# Patient Record
(demographics unavailable — no encounter records)

---

## 2017-05-14 NOTE — ER DOCUMENT REPORT
HPI





- HPI


Patient complains to provider of: cough


Onset: Yesterday


Onset/Duration: Worse


Quality of pain: Other - Tightness


Pain Level: 5


Context: 


Patient complains of cough and congestion that started yesterday.  Patient does 

report recent sick contact was diagnosed with bronchitis recently.  Patient 

without any fever.  Patient has had wheezing and complains of chest tightness.


Associated Symptoms: Chest pain, Nonproductive cough, Sinus pain/drainage.  

denies: Chills, Fever, Nausea, Vomiting, Sore throat


Exacerbated by: Denies


Relieved by: Denies


Similar symptoms previously: No


Recently seen / treated by doctor: No





- ROS


ROS below otherwise negative: Yes


Systems Reviewed and Negative: Yes All other systems reviewed and negative





- CONSTITUTIONAL


Constitutional: DENIES: Fever, Chills





- EENT


EENT: REPORTS: Nasal Drainage-Clear.  DENIES: Sore Throat





- NEURO


Neurology: DENIES: Headache





- CARDIOVASCULAR


Cardiovascular: REPORTS: Chest pain





- RESPIRATORY


Respiratory: REPORTS: Coughing.  DENIES: Trouble Breathing





- GASTROINTESTINAL


Gastrointestinal: DENIES: Abdominal Pain, Patient vomiting, Diarrhea





- REPRODUCTIVE


Reproductive: DENIES: Pregnant:





- MUSCULOSKELETAL


Musculoskeletal: DENIES: Back Pain





- DERM


Skin Color: Normal


Skin Problems: None





Past Medical History





- General


Information source: Patient, Parent





- Social History


Smoking Status: Never Smoker


Frequency of alcohol use: None


Drug Abuse: None


Lives with: Family


Family History: Reviewed & Not Pertinent





- Medical History


Medical History: Negative


Renal/ Medical History: Denies: Hx Peritoneal Dialysis


Surgical Hx: Negative





- Immunizations


Immunizations up to date: Yes


Hx Diphtheria, Pertussis, Tetanus Vaccination: Yes





Vertical Provider Document





- CONSTITUTIONAL


Agree With Documented VS: Yes


Exam Limitations: No Limitations


General Appearance: WD/WN, No Apparent Distress





- INFECTION CONTROL


TRAVEL OUTSIDE OF THE [place] IN LAST 30 DAYS: No





- HEENT


HEENT: Atraumatic, Normocephalic.  negative: Pharyngeal Exudate, Pharyngeal 

Tenderness, Pharyngeal Erythema, Tympanic Membrane Red, Tympanic Membrane 

Bulging


Notes: 


Clear rhinorrhea, swollen nasal mucosa





- NECK


Neck: Normal Inspection, Supple.  negative: Lymphadenopathy-Left, 

Lymphadenopathy-Right





- RESPIRATORY


Respiratory: No Respiratory Distress, Wheezing - Diffuse wheezing bilaterally


O2 Sat by Pulse Oximetry: 99





- CARDIOVASCULAR


Cardiovascular: Regular Rhythm, No Murmur, Tachycardia





- BACK


Back: Normal Inspection





- MUSCULOSKELETAL/EXTREMETIES


Musculoskeletal/Extremeties: MAEW





- NEURO


Level of Consciousness: Awake, Alert, Appropriate


Motor/Sensory: No Motor Deficit





- DERM


Integumentary: Warm, Dry, No Rash





Course





- Re-evaluation


Re-evalutation: 


05/14/17 14:59


Patient with improved air movement bilaterally with continued scattered 

wheezing.  Father does not want to wait for third nebulizer treatment and would 

like to just get her prescriptions and go home.





05/14/17 


Patient tachycardic at discharge, patient with improved respiratory effort, 

resolved chest tightness, increased air movement and decreased wheezing 

bilaterally.  Suspect the patient's tachycardia is a result of the side effects 

of her nebulizer treatment that was just given.  Father declines waiting for 

any additional nebulizer treatments or any additional care at this time.





- Vital Signs


Vital signs: 


 











Temp Pulse Resp BP Pulse Ox


 


 98.6 F   110 H  18   132/71 H  99 


 


 05/14/17 13:19  05/14/17 13:19  05/14/17 13:19  05/14/17 13:19  05/14/17 13:19














Discharge





- Discharge


Clinical Impression: 


 Wheezing





Upper respiratory infection


Qualifiers:


 URI type: unspecified URI Qualified Code(s): J06.9 - Acute upper respiratory 

infection, unspecified





Condition: Stable


Disposition: HOME, SELF-CARE


Additional Instructions: 


Return immediately for any new or worsening symptoms





Followup with your primary care provider, call tomorrow to make a followup 

appointment





UPPER RESPIRATORY ILLNESS:





     You have a viral infection of the respiratory passages -- a "cold."  This 

common infection causes nasal congestion, drainage, and often sore throat and 

cough.  It is highly contagious.  The disease usually lasts about 10 to 14 days.


     There is no "cure" for the viral infection -- it must run its course.  If 

there is a complication, such as bacterial infection in the nose, sinuses, 

middle ear, or bronchial tubes, antibiotics may be required.  The antibiotics 

won't affect the virus.


     Drink plenty of fluids.  A humidifier may help.  An expectorant medication 

or decongestant may make you more comfortable.  Use acetaminophen or ibuprofen 

for fever or aches.


     See the doctor if fever persists over two days, if there is any 

significant worsening of your symptoms, or if you simply fail to improve as 

expected.








BRONCHOSPASM:





     You have tightness in the bronchial tubes, called bronchospasm. This often 

occurs with bronchial infections.  Allergies, inhaled chemicals, and polluted 

or cold air can also provoke bronchospasm. It's more likely in patients with 

asthma in the family.


     Emergency treatment of bronchospasm may include adrenaline shots or 

bronchodilator aerosol.  You may feel lightheaded and have a rapid pulse for an 

hour or two.  Rest and get plenty of fluids.


     At home, we'll treat you with a bronchodilator inhaler. Antibiotics and 

corticosteroids may be required for some patients. Until you recover, avoid 

chemical fumes, dusts, pollens, and exercising in very cold or dry air. If you 

smoke, stop now!!


     If you develop a fever, increased wheezing, chest pain, or severe 

shortness of breath, you should contact the doctor immediately.





INHALED BRONCHODILATORS:


     You have received a treatment of and/or prescription for an inhaled 

bronchodilator -- a medication which stimulates the airways in the lung to 

dilate.  This improves the flow of air in asthma, bronchitis, and emphysema.


     These medicines have some similarity to adrenaline, and can cause similar 

side effects:  shakiness, racing heart, and a sense of nervousness.  These side 

effects decrease with time.  Contact your doctor if these side effects are 

severe.


     Do not over-use the medicine.  Too-frequent use of the inhaler may make it 

ineffective.  Call your doctor if the inhaler is not controlling your symptoms 

at the prescribed doses.








STEROID MEDICATION:


     You have been given an injection of or oral medicine of the cortisone/

steroid class.  This medication is used to control inflammation or allergy.  

Trav t is usually only given for a short period of time, until the acute process 

subsides.


     There are usually no side effects from short-term use of cortisone-like 

medications.  Some persons feel an increased sense of well-being and are not 

sleepy at bedtime.  Long-term use of cortisone medications is best avoided, 

unless required for a severe condition.  If your condition does not remit, or 

relapses after the course of corticosteroid medication, you should consult your 

physician.








USE OF ACETAMINOPHEN (Tylenol):


     Acetaminophen may be taken for pain relief or fever control. It's much 

safer than aspirin, offering a wider range of "safe" dosages.  It is safe 

during pregnancy.  Some brand names are Tylenol, Panadol, Datril, Anacin 3, 

Tempra, and Liquiprin. Acetaminophen can be repeated every four hours.  The 

following are maximum recommended dosages:


>89 pounds or adults          650 mg to 900 mg


Acetaminophen can be repeated every four hours.  Maximum dose not to exceed 

4000 mg a day.








FOLLOW-UP CARE:


If you have been referred to a physician for follow-up care, call the physician

s office for an appointment as you were instructed or within the next two days.

  If you experience worsening or a significant change in your symptoms, notify 

the physician immediately or return to the Emergency Department at any time for 

re-evaluation.





Prescriptions: 


Albuterol Sulfate [Ventolin Hfa] 2 puff IH Q4HP PRN #17 gm


 PRN Reason: 


Inhaler,Assist Device,Accesory [Optichamber] 1 each MC Q4 PRN #1 each


 PRN Reason: 


Prednisone [Deltasone 20 mg Tablet] 3 tab PO DAILY 4 Days


Referrals: 


RACHEL NGUYEN MD [Primary Care Provider] - Follow up tomorrow

## 2017-12-06 NOTE — ER DOCUMENT REPORT
ED Medical Screen (RME)





- General


Chief Complaint: Nausea/Vomiting


Stated Complaint: VOMITING


Time Seen by Provider: 12/06/17 14:58


Notes: 





16-year-old female patient 6 weeks pregnant with nausea vomiting for the past 

week almost days.  She has been taking Zofran but has not really helped much.  

She has noted some red streaks in the vomitus at times.  She last vomited about 

30 minutes ago.  She is not particularly nauseous at this time.





I have greeted and performed a rapid initial assessment of this patient.  A 

comprehensive ED assessment and evaluation of the patient, analysis of test 

results and completion of the medical decision making process will be conducted 

by additional ED providers.


TRAVEL OUTSIDE OF THE [place] IN LAST 30 DAYS: No





Past Medical History


Renal/ Medical History: Denies: Hx Peritoneal Dialysis





- Immunizations


Immunizations up to date: Yes


Hx Diphtheria, Pertussis, Tetanus Vaccination: Yes





Physical Exam





- Vital signs


Vitals: 





 











Temp Pulse Resp BP Pulse Ox


 


 98.2 F   137 H  18   116/77   100 


 


 12/06/17 14:53  12/06/17 14:53  12/06/17 14:53  12/06/17 14:53  12/06/17 14:53














Course





- Vital Signs


Vital signs: 





 











Temp Pulse Resp BP Pulse Ox


 


 98.2 F   137 H  18   116/77   100 


 


 12/06/17 14:53  12/06/17 14:53  12/06/17 14:53  12/06/17 14:53  12/06/17 14:53

## 2017-12-06 NOTE — ER DOCUMENT REPORT
HPI





- HPI


Patient complains to provider of: Nausea and vomiting


Onset: Last week


Onset/Duration: Persistent


Quality of pain: No pain


Pain Level: Denies


Context: 





Patient presents stating she is 6 weeks pregnant and has had nausea and 

vomiting for the past week.  Patient reports vomiting 12 times today.  Patient 

denies any abdominal tenderness, vaginal bleeding or discharge.  Patient denies 

any urinary symptoms.  Patient has not followed up with a gynecologist to 

establish prenatal care.  Mother states patient had left over prescription of 

Zofran at home and she has been taking this but it is not been helping her 

symptoms.


Associated Symptoms: Nausea, Vomiting.  denies: Fever


Exacerbated by: Denies


Relieved by: Denies


Similar symptoms previously: No


Recently seen / treated by doctor: No





- ROS


ROS below otherwise negative: Yes


Systems Reviewed and Negative: Yes All other systems reviewed and negative





- CONSTITUTIONAL


Constitutional: DENIES: Fever, Chills





- EENT


EENT: DENIES: Sore Throat, Congestion





- NEURO


Neurology: DENIES: Headache





- CARDIOVASCULAR


Cardiovascular: DENIES: Chest pain





- RESPIRATORY


Respiratory: DENIES: Coughing





- GASTROINTESTINAL


Gastrointestinal: REPORTS: Nausea, Patient vomiting.  DENIES: Abdominal Pain, 

Diarrhea





- URINARY


Urinary: DENIES: Dysuria, Urgency, Frequency





- REPRODUCTIVE


Reproductive: DENIES: Pregnant:





- MUSCULOSKELETAL


Musculoskeletal: DENIES: Back Pain





- DERM


Skin Color: Normal, Pink


Skin Problems: None





Past Medical History





- General


Information source: Patient, Parent


Last Menstrual Period: 6 weeks pregnant





- Social History


Smoking Status: Never Smoker


Chew tobacco use (# tins/day): No


Frequency of alcohol use: None


Drug Abuse: None


Lives with: Family


Family History: Reviewed & Not Pertinent


Patient has suicidal ideation: No


Patient has homicidal ideation: No





- Medical History


Medical History: Negative


Renal/ Medical History: Denies: Hx Peritoneal Dialysis


Surgical Hx: Negative





- Immunizations


Immunizations up to date: Yes


Hx Diphtheria, Pertussis, Tetanus Vaccination: Yes





Vertical Provider Document





- CONSTITUTIONAL


Agree With Documented VS: Yes


Exam Limitations: No Limitations


General Appearance: WD/WN, No Apparent Distress





- INFECTION CONTROL


TRAVEL OUTSIDE OF THE [place] IN LAST 30 DAYS: No





- HEENT


HEENT: Atraumatic, Normocephalic





- NECK


Neck: Normal Inspection, Supple





- RESPIRATORY


Respiratory: Breath Sounds Normal, No Respiratory Distress


O2 Sat by Pulse Oximetry: 100





- CARDIOVASCULAR


Cardiovascular: Regular Rhythm, No Murmur, Tachycardia





- GI/ABDOMEN


Gastrointestinal: Abdomen Soft, Abdomen Non-Tender, No Organomegaly





- BACK


Back: Normal Inspection.  negative: CVA Tenderness-Right, CVA Tenderness-Left





- MUSCULOSKELETAL/EXTREMETIES


Musculoskeletal/Extremeties: MAEW





- NEURO


Level of Consciousness: Awake, Alert, Appropriate


Motor/Sensory: No Motor Deficit





- DERM


Integumentary: Warm, Dry, No Rash





Course





- Re-evaluation


Re-evalutation: 





12/06/17 


Patient continues to deny any abdominal discomfort.  Patient is tolerating oral 

fluids.





- Vital Signs


Vital signs: 


 











Temp Pulse Resp BP Pulse Ox


 


 98.2 F   137 H  18   116/77   100 


 


 12/06/17 14:53  12/06/17 14:53  12/06/17 14:53  12/06/17 14:53  12/06/17 14:53














- Laboratory


Result Diagrams: 


 12/06/17 15:25





 12/06/17 15:25


Laboratory results interpreted by me: 


 











  12/06/17 12/06/17 12/06/17





  15:25 15:25 15:25


 


WBC  14.6 H  


 


RBC  5.73 H  


 


Hgb  16.8 H  


 


Hct  47.2 H  


 


Seg Neutrophils %  85.2 H  


 


Lymphocytes %  9.7 L  


 


Absolute Neutrophils  12.4 H  


 


BUN   6 L 


 


Total Protein   6.2 L 


 


Urine Protein    30 H


 


Urine Ketones    80 H











12/06/17 18:13


 Labs- Entire Visit











  12/06/17 12/06/17 12/06/17





  15:25 15:25 15:25


 


WBC  14.6 H  


 


RBC  5.73 H  


 


Hgb  16.8 H  


 


Hct  47.2 H  


 


MCV  82  


 


MCH  29.4  


 


MCHC  35.7  


 


RDW  13.1  


 


Plt Count  239  


 


Seg Neutrophils %  85.2 H  


 


Lymphocytes %  9.7 L  


 


Monocytes %  4.6  


 


Eosinophils %  0.1  


 


Basophils %  0.4  


 


Absolute Neutrophils  12.4 H  


 


Absolute Lymphocytes  1.4  


 


Absolute Monocytes  0.7  


 


Absolute Eosinophils  0.0  


 


Absolute Basophils  0.1  


 


Sodium   139.0 


 


Potassium   4.2 


 


Chloride   102 


 


Carbon Dioxide   23 


 


Anion Gap   14 


 


BUN   6 L 


 


Creatinine   0.59 


 


Est GFR ( Amer)   EGFR NOT CALCULATED 


 


Est GFR (Non-Af Amer)   EGFR NOT CALCULATED 


 


Glucose   85 


 


Calcium   10.0 


 


Total Bilirubin   0.8 


 


Direct Bilirubin   0.3 


 


Neonat Total Bilirubin   Not Reportable 


 


Neonat Direct Bilirubin   Not Reportable 


 


Neonat Indirect Bili   Not Reportable 


 


AST   21 


 


ALT   28 


 


Alkaline Phosphatase   71 


 


Total Protein   6.2 L 


 


Albumin   4.8 


 


Serum HCG, Qual   


 


Urine Color    YELLOW


 


Urine Appearance    SLIGHTLY-CLOUDY


 


Urine pH    5.0


 


Ur Specific Gravity    1.029


 


Urine Protein    30 H


 


Urine Glucose (UA)    NEGATIVE


 


Urine Ketones    80 H


 


Urine Blood    NEGATIVE


 


Urine Nitrite    NEGATIVE


 


Urine Bilirubin    NEGATIVE


 


Urine Urobilinogen    NEGATIVE


 


Ur Leukocyte Esterase    NEGATIVE


 


Urine RBC    0-1


 


Urine WBC    0-1


 


Urine Ascorbic Acid    NEGATIVE














  12/06/17





  15:25


 


WBC 


 


RBC 


 


Hgb 


 


Hct 


 


MCV 


 


MCH 


 


MCHC 


 


RDW 


 


Plt Count 


 


Seg Neutrophils % 


 


Lymphocytes % 


 


Monocytes % 


 


Eosinophils % 


 


Basophils % 


 


Absolute Neutrophils 


 


Absolute Lymphocytes 


 


Absolute Monocytes 


 


Absolute Eosinophils 


 


Absolute Basophils 


 


Sodium 


 


Potassium 


 


Chloride 


 


Carbon Dioxide 


 


Anion Gap 


 


BUN 


 


Creatinine 


 


Est GFR ( Amer) 


 


Est GFR (Non-Af Amer) 


 


Glucose 


 


Calcium 


 


Total Bilirubin 


 


Direct Bilirubin 


 


Neonat Total Bilirubin 


 


Neonat Direct Bilirubin 


 


Neonat Indirect Bili 


 


AST 


 


ALT 


 


Alkaline Phosphatase 


 


Total Protein 


 


Albumin 


 


Serum HCG, Qual  POSITIVE H


 


Urine Color 


 


Urine Appearance 


 


Urine pH 


 


Ur Specific Gravity 


 


Urine Protein 


 


Urine Glucose (UA) 


 


Urine Ketones 


 


Urine Blood 


 


Urine Nitrite 


 


Urine Bilirubin 


 


Urine Urobilinogen 


 


Ur Leukocyte Esterase 


 


Urine RBC 


 


Urine WBC 


 


Urine Ascorbic Acid 














Discharge





- Discharge


Clinical Impression: 


 Dehydration during pregnancy





Nausea & vomiting


Qualifiers:


 Vomiting type: unspecified Vomiting Intractability: non-intractable Qualified 

Code(s): R11.2 - Nausea with vomiting, unspecified





Pregnancy


Qualifiers:


 Weeks of gestation: less than 8 weeks Qualified Code(s): Z3A.01 - Less than 8 

weeks gestation of pregnancy





Condition: Stable


Disposition: HOME, SELF-CARE


Instructions:  Antinausea Medication ([facility]), Intravenous (IV) Fluids ([facility]), 

Vomiting ([facility])


Additional Instructions: 


Return immediately for any new or worsening symptoms





Followup with your primary care provider, call tomorrow to make a followup 

appointment





Follow-up with an OB/GYN provider to establish prenatal care


Prescriptions: 


Promethazine HCl [Phenergan 25 mg Tablet] 12.5 mg PO Q6H PRN #12 tablet


 PRN Reason: 


Forms:  Parent Work Note


Referrals: 


PRAKASH DINH MD [Primary Care Provider] - Follow up as needed


[facility] - Follow up tomorrow

## 2017-12-12 NOTE — ER DOCUMENT REPORT
ED Medical Screen (RME)





- General


Chief Complaint: Vomiting


Stated Complaint: VOMITING 7 WKS PREG


Time Seen by Provider: 12/12/17 15:38


Notes: 





16-year-old female who by history is almost 8 weeks pregnant.  She was seen 

here 6 days ago with hyperemesis gravidarum.  Got IV fluids and was discharged 

with Phenergan.  She reports initially it seemed to help but then quit working.

  She reports vomiting multiple times on a daily basis.





I have greeted and performed a rapid initial assessment of this patient.  A 

comprehensive ED assessment and evaluation of the patient, analysis of test 

results and completion of the medical decision making process will be conducted 

by additional ED providers.


TRAVEL OUTSIDE OF THE [place] IN LAST 30 DAYS: No





- Related Data


Allergies/Adverse Reactions: 


 





No Known Allergies Allergy (Verified 12/12/17 15:16)


 








Home Medications: 


 Current Home Medications





No Home Medications  12/12/17 [History]











Past Medical History





- Social History


Chew tobacco use (# tins/day): No


Frequency of alcohol use: None


Drug Abuse: None


Renal/ Medical History: Denies: Hx Peritoneal Dialysis





- Immunizations


Immunizations up to date: Yes


Hx Diphtheria, Pertussis, Tetanus Vaccination: Yes





Physical Exam





- Vital signs


Vitals: 





 











Temp Pulse Resp BP Pulse Ox


 


 99.1 F   103   17   120/69   99 


 


 12/12/17 15:21  12/12/17 15:21  12/12/17 15:21  12/12/17 15:21  12/12/17 15:21














Course





- Vital Signs


Vital signs: 





 











Temp Pulse Resp BP Pulse Ox


 


 99.1 F   103   17   120/69   99 


 


 12/12/17 15:21  12/12/17 15:21  12/12/17 15:21  12/12/17 15:21  12/12/17 15:21

## 2017-12-12 NOTE — ER DOCUMENT REPORT
ED GI/





- General


Chief Complaint: Vomiting


Stated Complaint: VOMITING 7 WKS PREG


Time Seen by Provider: 12/12/17 15:38


Mode of Arrival: Ambulatory


Information source: Patient, Parent, [facility] Records


Notes: 





This 16-year-old female who is almost 8 weeks pregnant comes emergency room for 

nausea vomiting.  She was seen here 6 days ago with the diagnosis of 

hyperemesis gravidarum.  She received IV fluids and was discharged home with 

[facility].  She reports it initially seemed to help, but then quit working.  

She reports she has been vomiting multiple times on a daily basis.





She has not been seen at the health department yet for prenatal care.  Her 

father reports that they have been "getting the run around".


TRAVEL OUTSIDE OF THE [place] IN LAST 30 DAYS: No





- Related Data


Allergies/Adverse Reactions: 


 





No Known Allergies Allergy (Verified 12/12/17 15:16)


 











Past Medical History





- General


Information source: Patient, Parent, [facility] Records





- Social History


Smoking Status: Never Smoker


Cigarette use (# per day): No


Chew tobacco use (# tins/day): No


Smoking Education Provided: No


Frequency of alcohol use: None


Drug Abuse: None


Occupation: Unemployed


Lives with: Parents


Family History: Reviewed & Not Pertinent


Patient has suicidal ideation: No


Patient has homicidal ideation: No





- Medical History


Medical History: Negative


Surgical Hx: Negative





- Immunizations


Immunizations up to date: Yes


Hx Diphtheria, Pertussis, Tetanus Vaccination: Yes





Review of Systems





- Review of Systems


Constitutional: No symptoms reported


EENT: No symptoms reported


Cardiovascular: No symptoms reported


Respiratory: No symptoms reported


Gastrointestinal: Nausea, Vomiting


Genitourinary: No symptoms reported


Female Genitourinary: Pregnant


Musculoskeletal: No symptoms reported


Skin: No symptoms reported


Hematologic/Lymphatic: No symptoms reported


Neurological/Psychological: No symptoms reported





Physical Exam





- Vital signs


Vitals: 


 











Temp Pulse Resp BP Pulse Ox


 


 99.1 F   103   17   120/69   99 


 


 12/12/17 15:21  12/12/17 15:21  12/12/17 15:21  12/12/17 15:21  12/12/17 15:21











Interpretation: Tachycardic





- General


General appearance: Appears well, Alert


In distress: Mild





- HEENT


Head: Normocephalic, Atraumatic


Eyes: Normal


Pupils: PERRL


Mucous membranes: Dry


Neck: Normal





- Respiratory


Respiratory status: No respiratory distress


Breath sounds: Normal





- Cardiovascular


Rhythm: Regular, Tachycardia


Heart sounds: Normal auscultation


Murmur: No





- Abdominal


Inspection: Normal


Distension: No distension


Bowel sounds: Normal


Tenderness: Nontender





- Back


Back: Normal





- Extremities


General upper extremity: Normal inspection


General lower extremity: Normal inspection





- Neurological


Neuro grossly intact: Yes





- Psychological


Associated symptoms: Normal affect, Normal mood





- Skin


Skin Temperature: Warm


Skin Moisture: Dry


Skin Color: Normal





Course





- Re-evaluation


Re-evalutation: 





12/12/17 19:38


The patient has been up to urinate twice after receiving 3 L of IV fluids.  She 

does feel much better.  She has had no nausea or vomiting since receiving 

Reglan orally when she first got here.  She has been able to hold down p.o. 

fluids.





- Vital Signs


Vital signs: 


 











Temp Pulse Resp BP Pulse Ox


 


 98.1 F   95   18   121/65   100 


 


 12/12/17 18:23  12/12/17 18:23  12/12/17 18:23  12/12/17 18:23  12/12/17 18:23














- Laboratory


Result Diagrams: 


 12/12/17 16:05





 12/12/17 16:05


Laboratory results interpreted by me: 


 











  12/12/17 12/12/17 12/12/17





  16:05 16:05 16:52


 


WBC  11.9 H  


 


RBC  5.69 H  


 


Hgb  16.8 H  


 


Hct  47.2 H  


 


Absolute Neutrophils  9.0 H  


 


Calcium   10.6 H 


 


Total Protein   8.3 H 


 


Urine Protein    30 H


 


Urine Ketones    80 H


 


Urine Urobilinogen    2.0 H














Discharge





- Discharge


Clinical Impression: 


 Hyperemesis gravidarum, Dehydration during pregnancy





Nausea & vomiting


Qualifiers:


 Vomiting type: unspecified Vomiting Intractability: non-intractable Qualified 

Code(s): R11.2 - Nausea with vomiting, unspecified





Condition: Stable


Disposition: HOME, SELF-CARE


Additional Instructions: 


Hyperemesis Gravidarum





     Hyperemesis gravidarum is the medical term for severe vomiting during 

pregnancy.  We don't know exactly why it occurs, but it's a common problem.


     Dehydration can occur.  This reduces blood flow to the placenta, 

decreasing the baby's nourishment.  The baby will also become dehydrated.  

There can be harmful changes in blood sodium, potassium, or acid balance.


     Our goal is to correct, and prevent, dehydration.  For severe cases, we 

give IV fluids.  Antinausea medication will be prescribed. (Don't be concerned 

about "birth defects" -- the risk to you and your baby from the hyperemesis is 

the biggest problem.  The antinausea medication is very safe at this stage of 

pregnancy.)


     Call the doctor if you have vaginal bleeding, abdominal pain, severe 

lightheadedness or weakness, or other alarming symptoms.








////////////////////////////////////////////////////////////////////////////////

////////////////////////////////////////////////////////





Take medication as prescribed for nausea if needed.


Drink small sips of cool clear liquids throughout the day and evening.


Follow-up with the Health Department this week to start your prenatal care.


Follow-up with [facility] if the nausea vomiting continues 

to be a problem.





RETURN TO THE EMERGENCY ROOM IF ANY NEW OR WORSENING SYMPTOMS.








Prescriptions: 


Metoclopramide HCl [Reglan 10 mg Tablet] 10 mg PO ASDIR PRN #30 tablet


 PRN Reason: 


Referrals: 


[facility] [Provider Group] - Follow up as needed


[facility] [Outside] - Follow up in 3-5 days

## 2018-03-16 NOTE — L&D PROGRESS NOTES
=================================================================

PROGRESS NOTES

=================================================================

Datetime Report Generated by CPN: 03/16/2018 15:32

   

   

=================================================================

PROGRESS NOTE

=================================================================

   

Comment:  abdomen soft and nontender, hyperactive bowel sounds. states

   she has frequent nausea and had BM today but never feels empty after

   BM. enocouraged to increase water, start stool softener, Rx for

   diclegis given

   

=================================================================

SIGNATURE

=================================================================

   

SIGNATURE:  [id number]

Assignment:  Tracy Stewart MD

Signature:  Electronically signed by Leni Palomino CNM  on 3/16/2018 at

   15:31  with User ID: [email or web address]

:  Electronically signed by Leni Palomino CNM  on 3/16/2018 at 15:31  with

   User ID: [email or web address]

## 2018-03-16 NOTE — RADIOLOGY REPORT (SQ)
EXAM DESCRIPTION:  U/S OB LIMITED



COMPLETED DATE/TIME:  3/16/2018 2:28 pm



REASON FOR STUDY:  abdominal pain, cramping, cervical length



COMPARISON:  None.



TECHNIQUE:  Limited transabdominal grayscale ultrasound for evaluation of specific requested obstetri
kb parameters.



LIMITATIONS:  None.



FINDINGS:  CERVICAL LENGTH: 3.8 cm.   Closed.

CHRISTOPH: Largest pocket 4.8  cm.

FHR: 155 beats per minute.

PRESENTATION: Breech.

PLACENTA:  Anterior.

OTHER: No other significant findings.



IMPRESSION:  LIMITED OBSTETRICAL ULTRASOUND WITH MEASURED PARAMETERS DELINEATED ABOVE.

Trimester of pregnancy: Second trimester - 13 weeks 1 day to 27 weeks 6 days.



TECHNICAL DOCUMENTATION:  JOB ID:  [id number]

 2011 [employer]- All Rights Reserved



Reading location - IP/workstation name: [email or web address]

## 2018-06-17 NOTE — ER DOCUMENT REPORT
ED ENT





- General


Chief Complaint: Ear Pain


Stated Complaint: CONGESTION


Time Seen by Provider: 06/17/18 18:28


Mode of Arrival: Ambulatory


Information source: Patient


Notes: 





Patient is a 16 year old female approximately 30 weeks pregnant who presents to 

the ER today for 3 days of congestion, bilateral ear pain, worse on the left 

and feeling like she cannot breathe through her nose.  Patient denies any fevers

, chills, productive cough, shortness of breath or wheezing.  Patient denies 

any history of asthma.  Has not tried anything for her symptoms.


TRAVEL OUTSIDE OF THE [place] IN LAST 30 DAYS: No





- Related Data


Allergies/Adverse Reactions: 


 





No Known Allergies Allergy (Verified 05/14/18 00:39)


 











Past Medical History





- General


Information source: Patient





- Social History


Smoking Status: Never Smoker


Frequency of alcohol use: None


Drug Abuse: None


Family History: Reviewed & Not Pertinent


Patient has suicidal ideation: No


Patient has homicidal ideation: No


Renal/ Medical History: Denies: Hx Peritoneal Dialysis





- Immunizations


Immunizations up to date: Yes


Hx Diphtheria, Pertussis, Tetanus Vaccination: Yes





Review of Systems





- Review of Systems


Constitutional: No symptoms reported


EENT: See HPI


Cardiovascular: No symptoms reported


Respiratory: See HPI


Gastrointestinal: No symptoms reported


Genitourinary: No symptoms reported


Female Genitourinary: No symptoms reported


Musculoskeletal: No symptoms reported


Skin: No symptoms reported


Hematologic/Lymphatic: No symptoms reported


Neurological/Psychological: No symptoms reported





Physical Exam





- Vital signs


Vitals: 


 











Temp Pulse Resp BP Pulse Ox


 


 98.7 F   110 H  20   127/74 H  98 


 


 06/17/18 18:34  06/17/18 18:34  06/17/18 18:34  06/17/18 18:34  06/17/18 18:34














- Notes


Notes: 





PHYSICAL EXAMINATION: 


GENERAL: Mildly ill-appearing, but in no acute distress. 


HEAD: Atraumatic, normocephalic. 


EYES: Pupils equal round and reactive to light, extraocular movements intact, 

sclera anicteric, conjunctiva are normal. 


ENT: ear canals without erythema or foreign body, TMs pearly grey with good 

bony landmarks, nares with mucoid discharge, oropharynx clear without exudates. 

Moist mucous membranes.  Airway patent


NECK: Normal range of motion, supple without lymphadenopathy 


LUNGS: CTAB and equal. No wheezes rales or rhonchi. 


HEART: Regular rate and rhythm without murmurs


ABDOMEN: Gravid abdomen, no tenderness. No guarding, no rebound 


BACK: no vertebral tenderness, normal ROM


GI/: no CVA tenderness


EXTREMITIES: Normal range of motion, no pitting edema. No cyanosis. 


NEUROLOGICAL: Cranial nerves grossly intact. Normal sensory/motor exams. 


PSYCH: Normal mood, normal affect. 


SKIN: Warm, Dry, normal turgor, no rashes or lesions noted 

















Course





- Re-evaluation


Re-evalutation: 





06/17/18 19:45


Patient is mildly tachycardic at 104 bpm, however she is 34 weeks pregnant with 

a cold and so I believe that this is due to a combination of those factors.  

Patient is drinking fluids here in the emergency department without any issue.  

She does not meet criteria for antibiotic with only 3 days of cold-like 

symptoms.  I will prescribe her Zyrtec which is safe in pregnancy and advised 

her to use Vicks vapor rub and nasal saline.





- Vital Signs


Vital signs: 


 











Temp Pulse Resp BP Pulse Ox


 


 98.4 F   104   18   119/76   98 


 


 06/17/18 19:40  06/17/18 19:40  06/17/18 19:40  06/17/18 19:40  06/17/18 19:40














Discharge





- Discharge


Clinical Impression: 


Upper respiratory infection


Qualifiers:


 URI type: acute nasopharyngitis (common cold) Qualified Code(s): J00 - Acute 

nasopharyngitis [common cold]





Condition: Stable


Disposition: HOME, SELF-CARE


Instructions:  Upper Respiratory Illness ([facility])


Additional Instructions: 


Return immediately for any new or worsening symptoms.





Follow up with primary care provider, call tomorrow to make followup 

appointment.





Drink plenty of fluids.  Please use Vicks VaporRub to open up her nasal 

passages as well as nasal saline spray.


Prescriptions: 


Cetirizine HCl [Zyrtec 10 mg Tablet] 1 tab PO DAILY #30 tablet


Referrals: 


IRMA NUNEZ MD [Primary Care Provider] - Follow up as needed

## 2018-07-14 NOTE — ER DOCUMENT REPORT
ED General





- General


Chief Complaint: Rash


Stated Complaint: RASH


Time Seen by Provider: 07/14/18 01:28


Mode of Arrival: Ambulatory


Information source: Patient


Notes: 





Patient is a 16-year-old female who is 37 weeks pregnant presenting with chief 

complaint of rash.  Patient reports that she has had a rash on her left leg, 

abdomen and right arm that has been ongoing for 2 weeks.  Patient reports that 

the rash comes and goes and is very itchy.  Patient denies any exposure to any 

new substances and denies any known allergens.  Patient denies any recent sick 

contacts and denies any fever.


TRAVEL OUTSIDE OF THE [place] IN LAST 30 DAYS: No





- Related Data


Allergies/Adverse Reactions: 


 





No Known Allergies Allergy (Verified 05/14/18 00:39)


 











Past Medical History





- General


Information source: Patient





- Social History


Smoking Status: Never Smoker


Chew tobacco use (# tins/day): No


Drug Abuse: None


Family History: Reviewed & Not Pertinent


Patient has suicidal ideation: No


Patient has homicidal ideation: No





- Medical History


Medical History: Negative


Renal/ Medical History: Denies: Hx Peritoneal Dialysis


Surgical Hx: Negative





- Immunizations


Immunizations up to date: Yes


Hx Diphtheria, Pertussis, Tetanus Vaccination: Yes





Review of Systems





- Review of Systems


Constitutional: No symptoms reported


EENT: No symptoms reported


Cardiovascular: No symptoms reported


Respiratory: No symptoms reported


Gastrointestinal: No symptoms reported


Genitourinary: No symptoms reported


Female Genitourinary: No symptoms reported


Musculoskeletal: No symptoms reported


Skin: See HPI


Hematologic/Lymphatic: No symptoms reported


Neurological/Psychological: No symptoms reported





Physical Exam





- Vital signs


Vitals: 


 











Temp Pulse Resp BP Pulse Ox


 


 98.4 F   95   18   130/83 H  100 


 


 07/14/18 00:52  07/14/18 00:52  07/14/18 00:52  07/14/18 00:52  07/14/18 00:52














- Notes


Notes: 





PHYSICAL EXAMINATION:





GENERAL: Well-appearing, well-nourished and in no acute distress.





HEAD: Atraumatic, normocephalic.





EYES: Pupils equal round and reactive to light, extraocular movements intact, 

conjunctiva are normal.





ENT: Nares patent, oropharynx clear without exudates.  Moist mucous membranes.





NECK: Normal range of motion, supple without lymphadenopathy





LUNGS: Breath sounds clear to auscultation bilaterally and equal.  No wheezes 

rales or rhonchi.





HEART: Regular rate and rhythm without murmurs





ABDOMEN: Soft, nontender, nondistended abdomen.  No guarding, no rebound.  No 

masses appreciated.





Female : deferred





Musculoskeletal: Normal range of motion, no pitting or edema.  No cyanosis.





NEUROLOGICAL: Cranial nerves grossly intact.  Normal speech, normal gait.  

Normal sensory, motor exams





PSYCH: Normal mood, normal affect.





SKIN: Warm, Dry, normal turgor, scattered maculopapular rash to bilateral lower 

extremities, right arm as well as a few areas on the abdomen and right flank.





Course





- Re-evaluation


Re-evalutation: 


16-year-old female presents with complaints of rash.  Patient reports a rash 

has been ongoing for approximately 2 weeks.  Patient reports that she is 37 

weeks pregnant.  Patient denies using any medications at home to try to 

alleviate the itching associated with the rash.  The rash appears to be a 

contact dermatitis of some sort.  Patient scratching the rash consistently 

during exam.  Will provide patient with p.o. Benadryl and place her on 1% 

hydrocortisone cream twice daily.  Patient is to follow-up with her OB/GYN next 

week.





- Vital Signs


Vital signs: 


 











Temp Pulse Resp BP Pulse Ox


 


 98.4 F   95   18   130/83 H  100 


 


 07/14/18 00:52  07/14/18 00:52  07/14/18 00:52  07/14/18 00:52  07/14/18 00:52














Discharge





- Discharge


Clinical Impression: 


Contact dermatitis


Qualifiers:


 Contact dermatitis type: unspecified Contact dermatitis trigger: unspecified 

trigger Qualified Code(s): L25.9 - Unspecified contact dermatitis, unspecified 

cause





Condition: Stable


Disposition: HOME, SELF-CARE


Additional Instructions: 


Rash of Pregnancy





     Some women develop an itchy rash in the second half of pregnancy. Small 

hive-like bumps usually start on the stomach and spread over the trunk, upper 

legs, and upper arms. The cause is unknown.


     The rash is harmless. Antihistamines such as diphenhydramine (Benadryl) 

can be used for itching. Hydrocortisone cream can be used on some of the worst 

spots. For severe cases, cortisone may be prescribed as pills.


     Your doctor should recheck the rash. Call the doctor or return if you 

develop blisters, painful skin, fever, vomiting, headache, or abdominal pain.








Prescriptions: 


Hydrocortisone/Oatmeal/Aloe/E [Hydrocortisone 1% Cream] 28.4 gm TP BID #1 tube


Referrals: 


IRMA NUNEZ MD [Primary Care Provider] - Follow up as needed

## 2018-07-20 NOTE — NON STRESS TEST REPORT
=================================================================

Non Stress Test

=================================================================

Datetime Report Generated by CPN: 07/20/2018 08:15

   

   

=================================================================

DEMOGRAPHIC

=================================================================

   

EGA NST:  38.5

   

=================================================================

INDICATION

=================================================================

   

Indication for Study:  Other

Indication for Study (NST) Other:  labor check

   

=================================================================

VITAL SIGNS

=================================================================

   

Temperature - NST:  97.7

Pulse - NST:  86

RESP - NST:  14

NBPSYS NST:  134

NBPDIA NST:  87

   

=================================================================

MONITORING

=================================================================

   

Monitor Explained:  Monitor Explained; Test Explained; Patient

   Verbalized Understanding

Time on Monitor:  07/20/2018 06:20

Time off Monitor:  07/20/2018 06:42

NST Duration:  22

   

=================================================================

NST INTERVENTIONS

=================================================================

   

NST Interventions:  PO Hydration

Physician Notified NST:  Dr. Major

BABY A:  [id number]

   

=================================================================

BABY A

=================================================================

   

Fetal Movement :  Present

Contraction Frequency :  2-3.5

FHR Baseline :  145

Accelerations :  15X15

Decelerations :  None

Variability :  Moderate 6-25bpm

NST Review:  Meets Criteria for Reactive NST

NST Review and Verified By :  Nickie Coley [facility]

NST Results:  Reactive

   

=================================================================

NST REPORT

=================================================================

   

Report Trigger:  Send Report

## 2018-07-21 NOTE — PDOC PROGRESS REPORT
Subjective-OB


Progress Note for:: 07/21/18





Physical Exam (OB)


Vital Signs: 


 











Temp Pulse Resp BP Pulse Ox


 


 98.0 F   141 H  16   114/61   99 


 


 07/21/18 07:31  07/21/18 07:31  07/21/18 07:31  07/21/18 07:31  07/21/18 07:31








 Intake & Output











 07/20/18 07/21/18 07/22/18





 06:59 06:59 06:59


 


Weight  83.5 kg 














- PIH/Pre-Eclampsia


Headache: Absent


Epigastric Pain: No


Visual Changes: No





- Lochia


Lochia Amount: Small 10-25 ml


Lochia Color: Rubra/Red





- Abdomen


Description: Tender, Soft


Hernia Present: No


Bowel Sounds: Normoactive


Flatus Presence: Present


Stool: No


Fundal Description: Firm, Midline


Fundal Height: u/u - u/2





Objective-Diagnostic


Laboratory: 


 





 07/21/18 07:27 





 











  07/20/18 07/20/18 07/21/18





  14:45 14:45 07:27


 


WBC  13.3 H   14.5 H


 


RBC  4.41   4.25


 


Hgb  12.8   12.2


 


Hct  36.0   35.0


 


MCV  82   82


 


MCH  29.0   28.8


 


MCHC  35.4   35.0


 


RDW  12.8   13.1


 


Plt Count  205   158


 


Seg Neutrophils %  75.5  


 


Lymphocytes %  14.6  


 


Monocytes %  8.1  


 


Eosinophils %  1.4  


 


Basophils %  0.4  


 


Absolute Neutrophils  10.0 H  


 


Absolute Lymphocytes  1.9  


 


Absolute Monocytes  1.1  


 


Absolute Eosinophils  0.2  


 


Absolute Basophils  0.0  


 


Blood Type   O NEGATIVE 


 


Antibody Screen   NEGATIVE 














  07/21/18





  07:27


 


WBC 


 


RBC 


 


Hgb 


 


Hct 


 


MCV 


 


MCH 


 


MCHC 


 


RDW 


 


Plt Count 


 


Seg Neutrophils % 


 


Lymphocytes % 


 


Monocytes % 


 


Eosinophils % 


 


Basophils % 


 


Absolute Neutrophils 


 


Absolute Lymphocytes 


 


Absolute Monocytes 


 


Absolute Eosinophils 


 


Absolute Basophils 


 


Blood Type  O NEGATIVE


 


Antibody Screen

## 2018-07-22 NOTE — PDOC PROGRESS REPORT
Subjective-OB


Progress Note for:: 07/22/18


Subjective: 





Ready to go home.





Physical Exam (OB)


Vital Signs: 


 











Temp Pulse Resp BP Pulse Ox


 


 98.2 F   73   16   124/82   98 


 


 07/21/18 19:27  07/21/18 19:27  07/21/18 19:27  07/21/18 19:27  07/21/18 19:27








 Intake & Output











 07/21/18 07/22/18 07/23/18





 06:59 06:59 06:59


 


Intake Total  600 300


 


Balance  600 300


 


Weight 83.5 kg  














- PIH/Pre-Eclampsia


Headache: Absent


Epigastric Pain: No


Visual Changes: No





- Lochia


Lochia Amount: Scant < 10 ml


Lochia Color: Rubra/Red





- Abdomen


Description: Tender, Soft


Hernia Present: No


Bowel Sounds: Normoactive


Flatus Presence: Present


Stool: Yes


Fundal Description: Firm, Midline


Fundal Height: u/u - u/2





Objective-Diagnostic


Laboratory: 


 





 07/21/18 07:27 





 











  07/21/18





  07:27


 


Blood Type  O NEGATIVE

## 2018-07-22 NOTE — PDOC DISCHARGE SUMMARY
Final Diagnosis


Discharge Date: 18





- Final Diagnosis


(1) Delivery normal


Is this a current diagnosis for this admission?: Yes   





(2) History of depression


Is this a current diagnosis for this admission?: Yes   





(3) Pregnancy


Is this a current diagnosis for this admission?: Yes   





(4) Teen pregnancy


Is this a current diagnosis for this admission?: Yes   





Discharge Data





- Discharge Medication


Home Medications: 








Prenat 115/Iron Fum/Folic/Dss [Prenatal 19 Tablet] 1 each PO DAILY 18 








Gestational Age: 38.5 wks


Reason(s) for Admission: Onset of Labor


Prenatal Procedures: Ultrasound


Intrapartum Procedure(s): Spontaneous Vaginal Delivery


Postpartum Complication(s): Laceration-Perineal


Laceration-Degree: 2nd





-  Data


  ** Baby 1 Female


Apgar at 1 minute: 8


Apgar at 5 minutes: 9


Weight: 3.175 kg


Home with Mother: Yes


Complications: No





- Diagnosis Test


Laboratory: 


 











Temp Pulse Resp BP Pulse Ox


 


 98.2 F   73   16   124/82   98 


 


 18 19:27  18 19:27  18 19:27  18 19:27  18 19:27








 











  18





  14:45 07:27 21:03


 


RBC  4.41  4.25 


 


Hgb  12.8  12.2 


 


Hct  36.0  35.0 


 


Urine Opiates Screen    UNCONFIRMED POSITIVE














- Discharge information/Instructions


Discharge Activity: Activity As Tolerated, Balance Activity w/Rest, Pelvic Rest

, Slowly Increase Activity, No tub bath


Discharge Diet: Regular


Disposition: HOME, SELF-CARE


Follow up with: [facility]


in: 4, Weeks

## 2018-07-30 NOTE — DELIVERY SUMMARY
=================================================================

Del Sum A-C

=================================================================

Datetime Report Generated by CPN: 2018 17:05

   

   

=================================================================

DELIVERY PERSONNEL

=================================================================

   

DELIVERY PERSONNEL:  [id number]

Delivery Doctor::  Augustina Larose MD

Labor and Delivery Nurse::  Bolivar White RN

Nursery Nurse::  Yue Cullen RN

Scrub Tech/CNA:  Sejal Adams CNA II

Scrub Tech/CNA:  Megan Hill, CST

   

=================================================================

MATERNAL INFORMATION

=================================================================

   

Delivery Anesthesia:  Epidural

Medications After Delivery:  Pitocin Drip 20 Units/1000ml NSS

Estimated  Blood Loss (ml):  100

Maternal Complications:  Precipitous Labor (<3hrs)

   

=================================================================

LABOR SUMMARY

=================================================================

   

EDC:  2018 00:00

No. Babies in Womb:  1

 Attempted:  No

Labor Anesthesia:  Epidural

   

=================================================================

LABOR INFORMATION

=================================================================

   

Reason for Induction:  Not Applicable

Onset of Labor:  2018 15:44

Complete Dilatation:  2018 17:43

Other Ripening Agents:  n/a

Oxytocin:  N/A

Group B Beta Strep:  Negative

Antibiotics # of Doses:  0

Antibiotics Time of Last Dose:  N/A

Name of Antibiotic Given:  N/A

Steroids Given:  None

Reason Steroids Not Administered:  Not Applicable

Other Reason Not Administered:  N/A

   

=================================================================

MEMBRANES

=================================================================

   

Membranes Rupture Method:  Spontaneous

Rupture of Membranes:  2018 13:50

Length of Rupture (hr):  4.47

Amniotic Fluid Color:  Clear

Amniotic Fluid Amount:  Moderate

Amniotic Fluid Odor:  None

   

=================================================================

STAGES OF LABOR

=================================================================

   

Stage 1 hr:  1

Stage 1 min:  59

Stage 2 hr:  0

Stage 2 min:  35

Stage 3 hr:  0

Stage 3 min:  4

Total Time in Labor hr:  2

Total Time in Labor min:  38

   

=================================================================

VAGINAL DELIVERY

=================================================================

   

Episiotomy:  Median

Laceration #1:  Perineal

Laceration Extension #1:  Second Degree

Laceration Repair:  Yes

Laceration Repair Note:  2-0 chromic

   

=================================================================

CSECTION DELIVERY

=================================================================

   

Primary Indication:  N/A

Secondary Indication:  N/A

   

=================================================================

BABY A INFORMATION

=================================================================

   

Infant Delivery Date/Time:  2018 18:18

Method of Delivery:  Vaginal

Born in Route :  No

:  N/A

Forceps:  N/A

Vacuum Extraction:  N/A

Shoulder Dystocia :  No

   

=================================================================

PRESENTATION/POSITION BABY A

=================================================================

   

Presentation:  Cephalic

Cephalic Presentation:  Vertex

Vertex Position:  Left Occipital Anterior

Breech Presentation:  N/A

   

=================================================================

PLACENTA INFORMATION BABY A

=================================================================

   

Placenta Delivery Time :  2018 18:22

Placenta Method of Delivery:  Spontaneous

Placenta Status:  Delivered

   

=================================================================

APGAR SCORES BABY A

=================================================================

   

Heart Rate 1 min:  >100 bpm

Resp Effort 1 min:  Good Cry

Reflex Irritability 1 min:  Cough or Sneeze or Pulls Away

Muscle Tone 1 min:  Active Motion

Color 1 min:  Blue/Pale

Resuscitation Effort 1 min:  Tactile Stimulation

APGAR SCORE 1 MIN:  8

Heart Rate 5 min:  >100 bpm

Resp Effort 5 min:  Good Cry

Reflex Irritability 5 min:  Cough or Sneeze or Pulls Away

Muscle Tone 5 min:  Active Motion

Color 5 min:  Body Pink, Extremities Blue

Resuscitation Effort 5 min:  Tactile Stimulation

APGAR SCORE 5 MIN:  9

   

=================================================================

INFANT INFORMATION BABY A

=================================================================

   

Gestational Age at Delivery:  38.5

Gestational Status:  Early Term- 37- 38.6 Weeks

Infant Outcome :  Liveborn

Infant Condition :  Stable

Infant Sex:  Female

   

=================================================================

IDENTIFICATION BABY A

=================================================================

   

Infant Verification Date/Time:  2018 18:39

ID Band Number:  [id number]

Mother's Name Verified:  Yes

Infant Medical Record Number:  [id number]

RN Verifying Infant:  D Sprouce US/D Bellavance RN

   

=================================================================

WEIGHT/LENGTH BABY A

=================================================================

   

Infant Birthweight (gm):  3170

Infant Weight (lb):  7

Infant Weight (oz):  0

Infant Length (in):  19.50

Infant Length (cm):  49.53

   

=================================================================

CORD INFORMATION BABY A

=================================================================

   

No. Cord Vessels:  3

Nuchal Cord :  N/A

Cord Blood Taken:  Yes-For Eval (Mom's Blood Type - or O+)

Infant Suction:  Mouth; Nose

   

=================================================================

ASSESSMENT BABY A

=================================================================

   

Skin to Skin:  Yes

   

=================================================================

BABY B INFORMATION

=================================================================

   

 :  N/A

   

=================================================================

SIGNATURES

=================================================================

   

Signature:  Electronically signed by Augustina Larose MD (ANDDO) on

   2018 at 18:48  with User ID: [id number]

:  I was personally available for consultation and serving as

   supervising physician for the MLP.

:  I personally evaluated and examined the patient in conjunction with

   the MLP and agree with the assessment, treatment plan and

   disposition.